# Patient Record
Sex: MALE | Race: OTHER
[De-identification: names, ages, dates, MRNs, and addresses within clinical notes are randomized per-mention and may not be internally consistent; named-entity substitution may affect disease eponyms.]

---

## 2018-06-16 ENCOUNTER — HOSPITAL ENCOUNTER (EMERGENCY)
Dept: HOSPITAL 80 - FED | Age: 28
Discharge: HOME | End: 2018-06-16
Payer: SELF-PAY

## 2018-06-16 VITALS — SYSTOLIC BLOOD PRESSURE: 105 MMHG | DIASTOLIC BLOOD PRESSURE: 64 MMHG

## 2018-06-16 DIAGNOSIS — S62.666A: Primary | ICD-10-CM

## 2018-06-16 DIAGNOSIS — R55: ICD-10-CM
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
## 2018-06-16 LAB — PLATELET # BLD: 181 10^3/UL (ref 150–400)

## 2018-06-16 PROCEDURE — L3925 FO PIP DIP JNT/SPRNG PRE OTS: HCPCS
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
## 2018-06-16 NOTE — CPEKG
Heart Rate: 59

RR Interval: 1017

P-R Interval: 172

QRSD Interval: 102

QT Interval: 392

QTC Interval: 389

P Axis: 71

QRS Axis: 92

T Wave Axis: 53

EKG Severity - OTHERWISE NORMAL ECG -

EKG Impression: SINUS RHYTHM

EKG Impression: BORDERLINE RIGHT AXIS DEVIATION

EKG Impression: ST ELEV, PROBABLE NORMAL EARLY REPOL PATTERN

Electronically Signed By: Elsie Martin 16-Jun-2018 21:10:33